# Patient Record
Sex: MALE | Race: BLACK OR AFRICAN AMERICAN | Employment: UNEMPLOYED | URBAN - METROPOLITAN AREA
[De-identification: names, ages, dates, MRNs, and addresses within clinical notes are randomized per-mention and may not be internally consistent; named-entity substitution may affect disease eponyms.]

---

## 2024-11-06 ENCOUNTER — HOSPITAL ENCOUNTER (EMERGENCY)
Age: 64
Discharge: HOME | End: 2024-11-06
Attending: EMERGENCY MEDICINE
Payer: MEDICAID

## 2024-11-06 VITALS
OXYGEN SATURATION: 100 % | SYSTOLIC BLOOD PRESSURE: 162 MMHG | RESPIRATION RATE: 16 BRPM | HEART RATE: 73 BPM | DIASTOLIC BLOOD PRESSURE: 96 MMHG | TEMPERATURE: 97.9 F

## 2024-11-06 DIAGNOSIS — F14.90 COCAINE USE: Primary | ICD-10-CM

## 2024-11-06 DIAGNOSIS — Z76.5 MALINGERING: ICD-10-CM

## 2024-11-06 LAB
AMPHET UR QL SCN: NEGATIVE
ANION GAP SERPL CALC-SCNC: 7 MMOL/L (ref 7–16)
BARBITURATES UR QL SCN: NEGATIVE
BASOPHILS # BLD: 0.1 K/UL (ref 0–0.2)
BASOPHILS NFR BLD: 1 % (ref 0–2)
BENZODIAZ UR QL: NEGATIVE
BUN SERPL-MCNC: 17 MG/DL (ref 8–23)
CALCIUM SERPL-MCNC: 9.1 MG/DL (ref 8.8–10.2)
CANNABINOIDS UR QL SCN: NEGATIVE
CHLORIDE SERPL-SCNC: 104 MMOL/L (ref 98–107)
CO2 SERPL-SCNC: 30 MMOL/L (ref 20–29)
COCAINE UR QL SCN: POSITIVE
CREAT SERPL-MCNC: 1.28 MG/DL (ref 0.8–1.3)
DIFFERENTIAL METHOD BLD: ABNORMAL
EOSINOPHIL # BLD: 0.1 K/UL (ref 0–0.8)
EOSINOPHIL NFR BLD: 3 % (ref 0.5–7.8)
ERYTHROCYTE [DISTWIDTH] IN BLOOD BY AUTOMATED COUNT: 12.6 % (ref 11.9–14.6)
ETHANOL SERPL-MCNC: <11 MG/DL (ref 0–0.08)
GLUCOSE SERPL-MCNC: 80 MG/DL (ref 70–99)
HCT VFR BLD AUTO: 38.5 % (ref 41.1–50.3)
HGB BLD-MCNC: 11.9 G/DL (ref 13.6–17.2)
IMM GRANULOCYTES # BLD AUTO: 0 K/UL (ref 0–0.5)
IMM GRANULOCYTES NFR BLD AUTO: 0 % (ref 0–5)
LYMPHOCYTES # BLD: 1.2 K/UL (ref 0.5–4.6)
LYMPHOCYTES NFR BLD: 23 % (ref 13–44)
MCH RBC QN AUTO: 29.3 PG (ref 26.1–32.9)
MCHC RBC AUTO-ENTMCNC: 30.9 G/DL (ref 31.4–35)
MCV RBC AUTO: 94.8 FL (ref 82–102)
METHADONE UR QL: NEGATIVE
MONOCYTES # BLD: 0.5 K/UL (ref 0.1–1.3)
MONOCYTES NFR BLD: 9 % (ref 4–12)
NEUTS SEG # BLD: 3.2 K/UL (ref 1.7–8.2)
NEUTS SEG NFR BLD: 64 % (ref 43–78)
NRBC # BLD: 0 K/UL (ref 0–0.2)
OPIATES UR QL: NEGATIVE
PCP UR QL: NEGATIVE
PLATELET # BLD AUTO: 261 K/UL (ref 150–450)
PMV BLD AUTO: 10.8 FL (ref 9.4–12.3)
POTASSIUM SERPL-SCNC: 4.3 MMOL/L (ref 3.5–5.1)
RBC # BLD AUTO: 4.06 M/UL (ref 4.23–5.6)
SODIUM SERPL-SCNC: 142 MMOL/L (ref 136–145)
WBC # BLD AUTO: 5.1 K/UL (ref 4.3–11.1)

## 2024-11-06 PROCEDURE — 82077 ASSAY SPEC XCP UR&BREATH IA: CPT

## 2024-11-06 PROCEDURE — 80307 DRUG TEST PRSMV CHEM ANLYZR: CPT

## 2024-11-06 PROCEDURE — 80048 BASIC METABOLIC PNL TOTAL CA: CPT

## 2024-11-06 PROCEDURE — 85025 COMPLETE CBC W/AUTO DIFF WBC: CPT

## 2024-11-06 PROCEDURE — 99285 EMERGENCY DEPT VISIT HI MDM: CPT

## 2024-11-06 ASSESSMENT — PAIN - FUNCTIONAL ASSESSMENT: PAIN_FUNCTIONAL_ASSESSMENT: NONE - DENIES PAIN

## 2024-11-06 NOTE — ED PROVIDER NOTES
Emergency Department Provider Note                   PCP:                No primary care provider on file.               Age: 64 y.o.      Sex: male       ICD-10-CM    1. Cocaine use  F14.90       2. Malingering  Z76.5           DISPOSITION Decision To Discharge 11/06/2024 01:14:40 PM             MDM  Number of Diagnoses or Management Options  Cocaine use  Malingering  Diagnosis management comments: MEDICAL DECISION MAKING  Complexity of Problems Addressed:  1 or more acute illnesses that pose a threat to life or bodily function.     Data Reviewed and Analyzed:  Category 1:   I independently ordered and reviewed each unique test.    Category 3: Discussion of management or test interpretation.  The patient is homeless, presents with reported SI. He was noted to be at another hospital yesterday with the same presentation. Psychiatry was consulted to see patient. When the provided went to evaluate the patient, he refused to speak to her because she did not know the ETA of his lunch tray. Clearly, this is not consistent with being suicidal and wanting assistance. The patient is discharged with resources given.   The management of this patient was discussed with an external consultant.      Risk of Complications and/or Morbidity of Patient Management:  Discussion with external consultants.                Orders Placed This Encounter   Procedures    CBC with Auto Differential    BMP    Urine Drug Screen    Ethanol    ADULT DIET; Regular    1:1 Constant Observer    Inpatient consult to Psychiatry    Suicide precautions        Medications - No data to display    New Prescriptions    No medications on file        Rosa Maria Rsoado is a 64 y.o. male who presents to the Emergency Department with chief complaint of    Chief Complaint   Patient presents with    Mental Health Problem      The patient presents via EMS for suicidal ideation. Per EMS, the patient stated he wanted to stab himself through the heart, although he does not

## 2024-11-06 NOTE — ED TRIAGE NOTES
Patient [facility] ED [facility] the bank. Patient is homeless and has no where to stay. Patient reports he is suicidal. Patient reports his plan is to stab himself in his heart with a knife. Patient is from [facility] and they tried to send him to [facility] but they did not have any beds available.

## 2024-11-06 NOTE — ED NOTES
Pt broke his own glasses and threw them because he was upset we were discharging him although he refused a psych eval.     Jazmin Morales RN  11/06/24 [id number]